# Patient Record
Sex: FEMALE | Race: WHITE | Employment: FULL TIME | URBAN - NONMETROPOLITAN AREA
[De-identification: names, ages, dates, MRNs, and addresses within clinical notes are randomized per-mention and may not be internally consistent; named-entity substitution may affect disease eponyms.]

---

## 2023-07-28 ENCOUNTER — OFFICE VISIT (OUTPATIENT)
Dept: PRIMARY CARE CLINIC | Age: 58
End: 2023-07-28
Payer: COMMERCIAL

## 2023-07-28 VITALS
RESPIRATION RATE: 16 BRPM | HEART RATE: 76 BPM | SYSTOLIC BLOOD PRESSURE: 118 MMHG | BODY MASS INDEX: 32.99 KG/M2 | WEIGHT: 192.2 LBS | DIASTOLIC BLOOD PRESSURE: 78 MMHG | OXYGEN SATURATION: 94 %

## 2023-07-28 DIAGNOSIS — R07.89 CHEST WALL PAIN: ICD-10-CM

## 2023-07-28 DIAGNOSIS — Z13.1 DIABETES MELLITUS SCREENING: ICD-10-CM

## 2023-07-28 DIAGNOSIS — R00.2 PALPITATIONS: Primary | ICD-10-CM

## 2023-07-28 DIAGNOSIS — Z13.220 LIPID SCREENING: ICD-10-CM

## 2023-07-28 PROCEDURE — 99204 OFFICE O/P NEW MOD 45 MIN: CPT | Performed by: STUDENT IN AN ORGANIZED HEALTH CARE EDUCATION/TRAINING PROGRAM

## 2023-07-28 SDOH — ECONOMIC STABILITY: INCOME INSECURITY: HOW HARD IS IT FOR YOU TO PAY FOR THE VERY BASICS LIKE FOOD, HOUSING, MEDICAL CARE, AND HEATING?: NOT HARD AT ALL

## 2023-07-28 SDOH — ECONOMIC STABILITY: FOOD INSECURITY: WITHIN THE PAST 12 MONTHS, YOU WORRIED THAT YOUR FOOD WOULD RUN OUT BEFORE YOU GOT MONEY TO BUY MORE.: NEVER TRUE

## 2023-07-28 SDOH — ECONOMIC STABILITY: FOOD INSECURITY: WITHIN THE PAST 12 MONTHS, THE FOOD YOU BOUGHT JUST DIDN'T LAST AND YOU DIDN'T HAVE MONEY TO GET MORE.: NEVER TRUE

## 2023-07-28 SDOH — ECONOMIC STABILITY: HOUSING INSECURITY
IN THE LAST 12 MONTHS, WAS THERE A TIME WHEN YOU DID NOT HAVE A STEADY PLACE TO SLEEP OR SLEPT IN A SHELTER (INCLUDING NOW)?: NO

## 2023-07-28 ASSESSMENT — PATIENT HEALTH QUESTIONNAIRE - PHQ9
SUM OF ALL RESPONSES TO PHQ QUESTIONS 1-9: 0
2. FEELING DOWN, DEPRESSED OR HOPELESS: 0
SUM OF ALL RESPONSES TO PHQ QUESTIONS 1-9: 0
SUM OF ALL RESPONSES TO PHQ9 QUESTIONS 1 & 2: 0
SUM OF ALL RESPONSES TO PHQ QUESTIONS 1-9: 0
SUM OF ALL RESPONSES TO PHQ QUESTIONS 1-9: 0
1. LITTLE INTEREST OR PLEASURE IN DOING THINGS: 0

## 2023-07-28 NOTE — PROGRESS NOTES
[address], [address], [place], [address] [place] is a 62 y.o. female with  has a past medical history of Foot fracture, right. Presented to the office today for:  Chief Complaint   Patient presents with    Established New Doctor    Palpitations       Assessment/Plan   1. Palpitations  -     CBC with Auto Differential; Future  -     Comprehensive Metabolic Panel; Future  -     TSH With Reflex Ft4; Future  -     Transthoracic echocardiogram (TTE) complete with contrast, bubble, strain, and 3D PRN; Future  -     Longterm Continuous Cardiac Event Monitor; Future  -     EKG 12 lead; Future  2. Chest wall pain  3. Lipid screening  -     Lipid Panel; Future  4. Diabetes mellitus screening  -     Hemoglobin A1C; Future    Return in about 3 months (around 10/28/2023) for F/U Cardiac Testing. Obtain labs for monitoring  May consider additional Cardiac w/u, including stress testing as well. All patient questions answered. Pt voiced understanding. There are no discontinued medications. Patient received counseling on the following healthy behaviors: nutrition, exercise and medication adherence. I encouraged and discussed lifestyle modifications including diet and exercise and the patient was agreeable to making positive/beneficial changes to both to help improve their overall health. Discussed use, benefit, and side effects of prescribed medications. Barriers to medication compliance addressed. Patient given educational materials: see patient instructions. HM - HM items completed today as per orders. Outstanding HM items though not limited to immunizations were discussed with the patient today, including risks, benefits and alternatives. The patient will discuss these during the next appointment per their preference.  If there are any worsening or concerning signs or symptoms, patient will report to the ED and/or contact [id number] for immediate

## 2023-07-28 NOTE — PATIENT INSTRUCTIONS
SURVEY:    You may be receiving a survey from [employer] regarding your visit today. Please complete the survey to enable us to provide the highest quality of care to you and your family. If you cannot score us a very good on any question, please call the office to discuss how we could have made your experience a very good one. Thank you.

## 2023-08-04 ENCOUNTER — TRANSCRIBE ORDERS (OUTPATIENT)
Dept: ADMINISTRATIVE | Age: 58
End: 2023-08-04

## 2023-08-04 ENCOUNTER — HOSPITAL ENCOUNTER (OUTPATIENT)
Age: 58
Discharge: HOME | End: 2023-08-04
Payer: COMMERCIAL

## 2023-08-04 DIAGNOSIS — Z13.220 LIPID SCREENING: ICD-10-CM

## 2023-08-04 DIAGNOSIS — R00.2 PALPITATIONS: Primary | ICD-10-CM

## 2023-08-04 DIAGNOSIS — R00.2 PALPITATIONS: ICD-10-CM

## 2023-08-04 DIAGNOSIS — Z13.1 DIABETES MELLITUS SCREENING: ICD-10-CM

## 2023-08-04 LAB
ALBUMIN SERPL-MCNC: 4.1 G/DL (ref 3.5–5.2)
ALBUMIN/GLOB SERPL: 1.2 {RATIO} (ref 1–2.5)
ALP SERPL-CCNC: 94 U/L (ref 35–104)
ALT SERPL-CCNC: 75 U/L (ref 5–33)
ANION GAP SERPL CALCULATED.3IONS-SCNC: 10 MMOL/L (ref 9–17)
AST SERPL-CCNC: 54 U/L
BASOPHILS # BLD: 0.07 K/UL (ref 0–0.2)
BASOPHILS NFR BLD: 1 % (ref 0–2)
BILIRUB SERPL-MCNC: 0.6 MG/DL (ref 0.3–1.2)
BUN SERPL-MCNC: 15 MG/DL (ref 6–20)
BUN/CREAT SERPL: 19 (ref 9–20)
CALCIUM SERPL-MCNC: 9.7 MG/DL (ref 8.6–10.4)
CHLORIDE SERPL-SCNC: 105 MMOL/L (ref 98–107)
CO2 SERPL-SCNC: 25 MMOL/L (ref 20–31)
CREAT SERPL-MCNC: 0.8 MG/DL (ref 0.5–0.9)
EOSINOPHIL # BLD: 0.12 K/UL (ref 0–0.44)
EOSINOPHILS RELATIVE PERCENT: 2 % (ref 1–4)
ERYTHROCYTE [DISTWIDTH] IN BLOOD BY AUTOMATED COUNT: 12.4 % (ref 11.8–14.4)
GFR SERPL CREATININE-BSD FRML MDRD: >60 ML/MIN/1.73M2
GLUCOSE SERPL-MCNC: 96 MG/DL (ref 70–99)
HCT VFR BLD AUTO: 47.5 % (ref 36.3–47.1)
HGB BLD-MCNC: 15.3 G/DL (ref 11.9–15.1)
IMM GRANULOCYTES # BLD AUTO: <0.03 K/UL (ref 0–0.3)
IMM GRANULOCYTES NFR BLD: 0 %
LYMPHOCYTES NFR BLD: 1.9 K/UL (ref 1.1–3.7)
LYMPHOCYTES RELATIVE PERCENT: 36 % (ref 24–43)
MCH RBC QN AUTO: 30.1 PG (ref 25.2–33.5)
MCHC RBC AUTO-ENTMCNC: 32.2 G/DL (ref 28.4–34.8)
MCV RBC AUTO: 93.3 FL (ref 82.6–102.9)
MONOCYTES NFR BLD: 0.58 K/UL (ref 0.1–1.2)
MONOCYTES NFR BLD: 11 % (ref 3–12)
NEUTROPHILS NFR BLD: 50 % (ref 36–65)
NEUTS SEG NFR BLD: 2.58 K/UL (ref 1.5–8.1)
NRBC BLD-RTO: 0 PER 100 WBC
PLATELET # BLD AUTO: 287 K/UL (ref 138–453)
PMV BLD AUTO: 10.4 FL (ref 8.1–13.5)
POTASSIUM SERPL-SCNC: 4.4 MMOL/L (ref 3.7–5.3)
PROT SERPL-MCNC: 7.4 G/DL (ref 6.4–8.3)
RBC # BLD AUTO: 5.09 M/UL (ref 3.95–5.11)
SODIUM SERPL-SCNC: 140 MMOL/L (ref 135–144)
TSH SERPL DL<=0.05 MIU/L-ACNC: 1.12 UIU/ML (ref 0.3–5)
WBC OTHER # BLD: 5.3 K/UL (ref 3.5–11.3)

## 2023-08-04 PROCEDURE — 84443 ASSAY THYROID STIM HORMONE: CPT

## 2023-08-04 PROCEDURE — 80053 COMPREHEN METABOLIC PANEL: CPT

## 2023-08-04 PROCEDURE — 85025 COMPLETE CBC W/AUTO DIFF WBC: CPT

## 2023-08-04 PROCEDURE — 83036 HEMOGLOBIN GLYCOSYLATED A1C: CPT

## 2023-08-04 PROCEDURE — 80061 LIPID PANEL: CPT

## 2023-08-04 PROCEDURE — 36415 COLL VENOUS BLD VENIPUNCTURE: CPT

## 2023-08-05 LAB
CHOLEST SERPL-MCNC: 163 MG/DL
CHOLESTEROL/HDL RATIO: 2.9
HDLC SERPL-MCNC: 56 MG/DL
LDLC SERPL CALC-MCNC: 94 MG/DL (ref 0–130)
TRIGL SERPL-MCNC: 66 MG/DL

## 2023-08-06 LAB
EST. AVERAGE GLUCOSE BLD GHB EST-MCNC: 117 MG/DL
HBA1C MFR BLD: 5.7 % (ref 4–6)

## 2023-08-07 DIAGNOSIS — R73.03 PREDIABETES: Primary | ICD-10-CM

## 2023-08-07 DIAGNOSIS — R79.89 ELEVATED LFTS: Primary | ICD-10-CM

## 2023-08-14 ENCOUNTER — HOSPITAL ENCOUNTER (OUTPATIENT)
Dept: ULTRASOUND IMAGING | Age: 58
Discharge: HOME | End: 2023-08-16
Attending: STUDENT IN AN ORGANIZED HEALTH CARE EDUCATION/TRAINING PROGRAM
Payer: COMMERCIAL

## 2023-08-14 DIAGNOSIS — R79.89 ELEVATED LFTS: ICD-10-CM

## 2023-08-14 PROCEDURE — 76705 ECHO EXAM OF ABDOMEN: CPT

## 2023-08-27 PROBLEM — Z13.1 DIABETES MELLITUS SCREENING: Status: RESOLVED | Noted: 2023-07-28 | Resolved: 2023-08-27

## 2023-08-27 PROBLEM — Z13.220 LIPID SCREENING: Status: RESOLVED | Noted: 2023-07-28 | Resolved: 2023-08-27

## 2023-09-07 ENCOUNTER — HOSPITAL ENCOUNTER (OUTPATIENT)
Age: 58
Discharge: HOME | End: 2023-09-07
Attending: STUDENT IN AN ORGANIZED HEALTH CARE EDUCATION/TRAINING PROGRAM
Payer: COMMERCIAL

## 2023-09-07 ENCOUNTER — HOSPITAL ENCOUNTER (OUTPATIENT)
Age: 58
Discharge: HOME | End: 2023-09-09
Attending: STUDENT IN AN ORGANIZED HEALTH CARE EDUCATION/TRAINING PROGRAM
Payer: COMMERCIAL

## 2023-09-07 DIAGNOSIS — R00.2 PALPITATIONS: ICD-10-CM

## 2023-09-07 PROCEDURE — 93306 TTE W/DOPPLER COMPLETE: CPT

## 2023-09-07 PROCEDURE — 93005 ELECTROCARDIOGRAM TRACING: CPT

## 2023-09-07 PROCEDURE — 93243 EXT ECG>48HR<7D SCAN A/R: CPT

## 2023-09-08 LAB
ECHO AO ROOT DIAM: 3 CM
ECHO AV ACCELERATION TIME: 99.45 MS
ECHO AV CUSP MM: 1.8 CM
ECHO AV MEAN GRADIENT: 3 MMHG
ECHO AV MEAN VELOCITY: 0.8 M/S
ECHO AV PEAK VELOCITY: 1.2 M/S
ECHO AV VTI: 27.9 CM
ECHO LA DIAMETER: 3.9 CM
ECHO LA MAJOR AXIS: 5.2 CM
ECHO LA TO AORTIC ROOT RATIO: 1.3
ECHO LA VOL 2C: 33 ML (ref 22–52)
ECHO LA VOL 4C: 20 ML (ref 22–52)
ECHO LA VOL BP: 26 ML (ref 22–52)
ECHO LA VOLUME AREA LENGTH: 29 ML
ECHO LV E' LATERAL VELOCITY: 15 CM/S
ECHO LV EDV A2C: 47 ML
ECHO LV EDV A4C: 54 ML
ECHO LV EDV BP: 51 ML (ref 56–104)
ECHO LV EJECTION FRACTION BIPLANE: 62 % (ref 55–100)
ECHO LV ESV A2C: 18 ML
ECHO LV ESV A4C: 21 ML
ECHO LV ESV BP: 20 ML (ref 19–49)
ECHO LV FRACTIONAL SHORTENING: 40 % (ref 28–44)
ECHO LV INTERNAL DIMENSION DIASTOLIC: 4.5 CM (ref 3.9–5.3)
ECHO LV INTERNAL DIMENSION SYSTOLIC: 2.7 CM
ECHO LV IVSD: 0.9 CM (ref 0.6–0.9)
ECHO LV IVSS: 1.2 CM
ECHO LV MASS 2D: 123.1 G (ref 67–162)
ECHO LV POSTERIOR WALL DIASTOLIC: 0.8 CM (ref 0.6–0.9)
ECHO LV POSTERIOR WALL SYSTOLIC: 1.3 CM
ECHO LV RELATIVE WALL THICKNESS RATIO: 0.36
ECHO MV A VELOCITY: 0.69 M/S
ECHO MV E DECELERATION TIME (DT): 152.9 MS
ECHO MV E VELOCITY: 0.91 M/S
ECHO MV E/A RATIO: 1.32
ECHO MV E/E' LATERAL: 6.07
ECHO PV MAX VELOCITY: 0.7 M/S
ECHO TV REGURGITANT MAX VELOCITY: 2.27 M/S
EKG ATRIAL RATE: 53 BPM
EKG P AXIS: 27 DEGREES
EKG P-R INTERVAL: 144 MS
EKG Q-T INTERVAL: 426 MS
EKG QRS DURATION: 74 MS
EKG QTC CALCULATION (BAZETT): 399 MS
EKG R AXIS: 54 DEGREES
EKG T AXIS: 25 DEGREES
EKG VENTRICULAR RATE: 53 BPM

## 2023-11-03 ENCOUNTER — OFFICE VISIT (OUTPATIENT)
Dept: PRIMARY CARE CLINIC | Age: 58
End: 2023-11-03
Payer: COMMERCIAL

## 2023-11-03 VITALS
SYSTOLIC BLOOD PRESSURE: 126 MMHG | HEART RATE: 84 BPM | DIASTOLIC BLOOD PRESSURE: 72 MMHG | OXYGEN SATURATION: 94 % | WEIGHT: 186 LBS | BODY MASS INDEX: 31.76 KG/M2 | HEIGHT: 64 IN

## 2023-11-03 DIAGNOSIS — R00.2 PALPITATIONS: Primary | ICD-10-CM

## 2023-11-03 DIAGNOSIS — K08.9 POOR DENTITION: ICD-10-CM

## 2023-11-03 PROCEDURE — 99213 OFFICE O/P EST LOW 20 MIN: CPT | Performed by: STUDENT IN AN ORGANIZED HEALTH CARE EDUCATION/TRAINING PROGRAM

## 2023-11-03 NOTE — PROGRESS NOTES
Jennifer Ig[address]ias Dr, [place] [address], [place], [address] is a 62 y.o. female with  has a past medical history of Foot fracture, right. Presented to the office today for:  Chief Complaint   Patient presents with    Discuss Labs       Assessment/Plan   1. Palpitations  2. Poor dentition  Return in about 1 year (around 11/3/2024) for F/U Meds. Continue to monitor at this time - off any medications, if symptoms persist may consider starting a medication     All patient questions answered. Pt voiced understanding. There are no discontinued medications. Patient received counseling on the following healthy behaviors: nutrition, exercise and medication adherence. I encouraged and discussed lifestyle modifications including diet and exercise and the patient was agreeable to making positive/beneficial changes to both to help improve their overall health. Discussed use, benefit, and side effects of prescribed medications. Barriers to medication compliance addressed. Patient given educational materials: see patient instructions. HM - HM items completed today as per orders. Outstanding HM items though not limited to immunizations were discussed with the patient today, including risks, benefits and alternatives. The patient will discuss these during the next appointment per their preference. If there are any worsening or concerning signs or symptoms, patient will report to the ED and/or contact [id number] for immediate evaluation. Teach back method was used. Subjective:    HPI  Chief Complaint   Patient presents with    Discuss Labs     Pt presents to discuss her cardiac testing which was reviewed. The patient continues to have intermittent fluttering at times. She had it earlier today, but did not have it for months.  \Patient denies chest pain, chest pressure/discomfort, claudication, dyspnea, exertional chest pressure/discomfort, fatigue, irregular

## 2024-11-06 ENCOUNTER — OFFICE VISIT (OUTPATIENT)
Dept: PRIMARY CARE CLINIC | Age: 59
End: 2024-11-06
Payer: COMMERCIAL

## 2024-11-06 VITALS
HEIGHT: 64 IN | DIASTOLIC BLOOD PRESSURE: 78 MMHG | SYSTOLIC BLOOD PRESSURE: 116 MMHG | HEART RATE: 84 BPM | RESPIRATION RATE: 18 BRPM | BODY MASS INDEX: 35.34 KG/M2 | WEIGHT: 207 LBS

## 2024-11-06 DIAGNOSIS — J32.9 CHRONIC SINUSITIS, UNSPECIFIED LOCATION: ICD-10-CM

## 2024-11-06 DIAGNOSIS — Z00.00 WELL ADULT EXAM: Primary | ICD-10-CM

## 2024-11-06 DIAGNOSIS — R05.9 COUGH, UNSPECIFIED TYPE: ICD-10-CM

## 2024-11-06 PROCEDURE — 99396 PREV VISIT EST AGE 40-64: CPT | Performed by: STUDENT IN AN ORGANIZED HEALTH CARE EDUCATION/TRAINING PROGRAM

## 2024-11-06 RX ORDER — BENZONATATE 200 MG/1
200 CAPSULE ORAL 3 TIMES DAILY PRN
Qty: 42 CAPSULE | Refills: 0 | Status: SHIPPED | OUTPATIENT
Start: 2024-11-06 | End: 2024-11-20

## 2024-11-06 RX ORDER — LEVOFLOXACIN 750 MG/1
750 TABLET, FILM COATED ORAL DAILY
Qty: 7 TABLET | Refills: 0 | Status: SHIPPED | OUTPATIENT
Start: 2024-11-06 | End: 2024-11-13

## 2024-11-06 SDOH — ECONOMIC STABILITY: INCOME INSECURITY: HOW HARD IS IT FOR YOU TO PAY FOR THE VERY BASICS LIKE FOOD, HOUSING, MEDICAL CARE, AND HEATING?: NOT HARD AT ALL

## 2024-11-06 SDOH — ECONOMIC STABILITY: FOOD INSECURITY: WITHIN THE PAST 12 MONTHS, YOU WORRIED THAT YOUR FOOD WOULD RUN OUT BEFORE YOU GOT MONEY TO BUY MORE.: NEVER TRUE

## 2024-11-06 SDOH — ECONOMIC STABILITY: FOOD INSECURITY: WITHIN THE PAST 12 MONTHS, THE FOOD YOU BOUGHT JUST DIDN'T LAST AND YOU DIDN'T HAVE MONEY TO GET MORE.: NEVER TRUE

## 2024-11-06 ASSESSMENT — PATIENT HEALTH QUESTIONNAIRE - PHQ9
SUM OF ALL RESPONSES TO PHQ QUESTIONS 1-9: 0
SUM OF ALL RESPONSES TO PHQ QUESTIONS 1-9: 0
2. FEELING DOWN, DEPRESSED OR HOPELESS: NOT AT ALL
SUM OF ALL RESPONSES TO PHQ9 QUESTIONS 1 & 2: 0
1. LITTLE INTEREST OR PLEASURE IN DOING THINGS: NOT AT ALL
SUM OF ALL RESPONSES TO PHQ QUESTIONS 1-9: 0
SUM OF ALL RESPONSES TO PHQ QUESTIONS 1-9: 0

## 2024-11-14 DIAGNOSIS — Z12.31 ENCOUNTER FOR SCREENING MAMMOGRAM FOR MALIGNANT NEOPLASM OF BREAST: Primary | ICD-10-CM

## 2024-12-03 ENCOUNTER — OFFICE VISIT (OUTPATIENT)
Dept: PRIMARY CARE CLINIC | Age: 59
End: 2024-12-03

## 2024-12-03 VITALS
SYSTOLIC BLOOD PRESSURE: 118 MMHG | HEART RATE: 97 BPM | DIASTOLIC BLOOD PRESSURE: 76 MMHG | BODY MASS INDEX: 35.85 KG/M2 | WEIGHT: 210 LBS | OXYGEN SATURATION: 96 % | HEIGHT: 64 IN

## 2024-12-03 DIAGNOSIS — M25.561 CHRONIC PAIN OF RIGHT KNEE: ICD-10-CM

## 2024-12-03 DIAGNOSIS — G89.29 CHRONIC PAIN OF RIGHT KNEE: ICD-10-CM

## 2024-12-03 DIAGNOSIS — M25.561 ACUTE PAIN OF RIGHT KNEE: Primary | ICD-10-CM

## 2024-12-03 NOTE — PROGRESS NOTES
[facility]  [address] [place] [address] , [address]  [place], [place], [address]    Tara Whyte is a 59 y.o. female with  has a past medical history of Foot fracture, right.  Presented to the office today for:  Chief Complaint   Patient presents with    Knee Pain       Assessment/Plan   1. Acute pain of right knee  -     DRAIN/INJECT LARGE JOINT/BURSA  2. Chronic pain of right knee [M25.561, G89.29]  -     DRAIN/INJECT LARGE JOINT/BURSA    Return if symptoms worsen or fail to improve.    We discussed some of the etiologies and natural histories. We discussed the various treatment alternatives including anti-inflammatory medications, physical therapy, injections, further imaging studies and as a last resort surgery.      Assessment & Plan    Procedure Note    Procedure:  right Knee Injection     Indication:  Knee pain  Knee Pain    Medications:  Lidocaine 1% with epinephrine 1 cc & Kenalog 40 mg per mL 2 cc.    Consent:  The patient understood all the risks and benefits prior to treatment.  The risks explained included scarring, bleeding & infection.   Although this treatment is highly effective, recurrences do occur and this was explained to the patient.     Procedure:  The joint was prepped with Betadine & alcohol.  A 22 gauge 1 1/2 inch needle was inserted into the Knee joint. Kenalog & Lidocaine was then injected into the joint through the same needle. The needle was removed and the area cleansed and dressed. Well tolerated by patient.    Post-op Instruction:  Post-op instructions were given orally and in writing.  Signs of infection were discussed both orally and the patient was informed to call if any signs of infection develop such as increasing pain, purulent drainage, or skin changes.        Electronically signed by Chris Balbuena MD on 12/3/2024 at 10:28 AM         All patient questions answered.  Pt voiced understanding.   There are no discontinued medications.    Patient received

## 2024-12-06 PROBLEM — Z00.00 WELL ADULT EXAM: Status: RESOLVED | Noted: 2024-11-06 | Resolved: 2024-12-06

## 2024-12-06 PROBLEM — R05.9 COUGH: Status: RESOLVED | Noted: 2024-11-06 | Resolved: 2024-12-06

## 2025-03-11 ENCOUNTER — OFFICE VISIT (OUTPATIENT)
Dept: PRIMARY CARE CLINIC | Age: 60
End: 2025-03-11
Payer: COMMERCIAL

## 2025-03-11 VITALS
DIASTOLIC BLOOD PRESSURE: 78 MMHG | SYSTOLIC BLOOD PRESSURE: 104 MMHG | HEART RATE: 102 BPM | WEIGHT: 206.6 LBS | BODY MASS INDEX: 35.46 KG/M2 | OXYGEN SATURATION: 95 %

## 2025-03-11 DIAGNOSIS — J01.00 ACUTE NON-RECURRENT MAXILLARY SINUSITIS: Primary | ICD-10-CM

## 2025-03-11 PROCEDURE — 99213 OFFICE O/P EST LOW 20 MIN: CPT | Performed by: NURSE PRACTITIONER

## 2025-03-11 RX ORDER — BENZONATATE 100 MG/1
100 CAPSULE ORAL 3 TIMES DAILY PRN
Qty: 30 CAPSULE | Refills: 0 | Status: SHIPPED | OUTPATIENT
Start: 2025-03-11 | End: 2025-03-21

## 2025-03-11 SDOH — ECONOMIC STABILITY: FOOD INSECURITY: WITHIN THE PAST 12 MONTHS, YOU WORRIED THAT YOUR FOOD WOULD RUN OUT BEFORE YOU GOT MONEY TO BUY MORE.: NEVER TRUE

## 2025-03-11 SDOH — ECONOMIC STABILITY: FOOD INSECURITY: WITHIN THE PAST 12 MONTHS, THE FOOD YOU BOUGHT JUST DIDN'T LAST AND YOU DIDN'T HAVE MONEY TO GET MORE.: NEVER TRUE

## 2025-03-11 ASSESSMENT — PATIENT HEALTH QUESTIONNAIRE - PHQ9
2. FEELING DOWN, DEPRESSED OR HOPELESS: NOT AT ALL
SUM OF ALL RESPONSES TO PHQ QUESTIONS 1-9: 0
1. LITTLE INTEREST OR PLEASURE IN DOING THINGS: NOT AT ALL

## 2025-03-11 ASSESSMENT — ENCOUNTER SYMPTOMS
SINUS PRESSURE: 1
DIARRHEA: 0
VOICE CHANGE: 1
SORE THROAT: 0
VOMITING: 1
SINUS PAIN: 1
COUGH: 1
WHEEZING: 1
NAUSEA: 0
CHEST TIGHTNESS: 0
SHORTNESS OF BREATH: 1

## 2025-03-11 NOTE — PATIENT INSTRUCTIONS
1. Acute non-recurrent maxillary sinusitis  - benzonatate (TESSALON) 100 MG capsule; Take 1 capsule by mouth 3 times daily as needed for Cough  Dispense: 30 capsule; Refill: 0  - amoxicillin-clavulanate (AUGMENTIN) 875-125 MG per tablet; Take 1 tablet by mouth 2 times daily for 10 days  Dispense: 20 tablet; Refill: 0  - Symptomatic Management:   Increased hydration to thin secretions  120 ounces of water daily  Cool mist humidification nightly for cough and congestion  Henrry's vapor rub at night for cough and congestion  Apply nightly to chest and soles of feet with socks to cover  Avoid lying flat while resting. Prop up on 2-3 pillows to maintain head elevation.  Warm salt water gargles for sore throat  every 4-6 hours as needed  Buckwheat honey for sore throat AND cough  1 teaspoon every 4-6 hours as needed  Nasal saline lavage   Twice daily to thin/cleanse secretions  Alternate OTC analgesics for pain and fever management  Tylenol 325 mg ii tabs by mouth every 6 hours for pain/fever  Ibuprofen 200 mg ii tabs by mouth every 6 hours as needed for pain/fever  Decongestants for sinus pressure to ears and face  Flonase 2 sprays to each nostril daily    - Educated Tara regarding the expected course of illness. Return precautions discussed at length. Tara verbalized understanding and agrees with the plan.      Return if symptoms worsen or fail to improve.    If symptoms fail to improve or worsen rapidly, Tara is encouraged to report to an ED for prompt evaluation and treatment.

## 2025-03-11 NOTE — PROGRESS NOTES
3/11/2025     Tara Whyte (:  1965) is a 59 y.o. female, here for evaluation of the following medical concerns:  Chief Complaint:   Chief Complaint   Patient presents with    Cold Symptoms     Cough, headache (sinus pressure), threw up from coughing so hard this morning.  2 weeks ago she had a fever and was sick again last weekend.   Patient states she's struggling to get rid of the coughing.   Denies: sore throat, body aches  Medication: Sudafed  Started:       Cold Symptoms   This is a new problem. The current episode started 1 to 4 weeks ago. Associated symptoms include congestion, coughing, headaches, sinus pain, vomiting and wheezing. Pertinent negatives include no chest pain, diarrhea, ear pain, nausea or sore throat.       Prior to Visit Medications    Medication Sig Taking? Authorizing Provider   benzonatate (TESSALON) 100 MG capsule Take 1 capsule by mouth 3 times daily as needed for Cough Yes Gilmar Dillard APRN - CNP   amoxicillin-clavulanate (AUGMENTIN) 875-125 MG per tablet Take 1 tablet by mouth 2 times daily for 10 days Yes Gilmar Dillard APRN - CNP   Multiple Vitamins-Minerals (MULTIVITAMIN WOMEN 50+ PO) Take by mouth Yes Provider, Danielle, MD        Social History     Tobacco Use    Smoking status: Never    Smokeless tobacco: Never   Substance Use Topics    Alcohol use: Never        Vitals:    25 1147   BP: 104/78   Pulse: (!) 102   SpO2: 95%   Weight: 93.7 kg (206 lb 9.6 oz)     Estimated body mass index is 35.46 kg/m² as calculated from the following:    Height as of 12/3/24: 1.626 m (5' 4\").    Weight as of this encounter: 93.7 kg (206 lb 9.6 oz).    Review of Systems   Constitutional:  Positive for appetite change and fatigue. Negative for chills, diaphoresis and fever.   HENT:  Positive for congestion, sinus pressure, sinus pain and voice change. Negative for ear discharge, ear pain and sore throat.    Respiratory:  Positive for cough, shortness of breath and

## 2025-06-02 ENCOUNTER — OFFICE VISIT (OUTPATIENT)
Dept: PRIMARY CARE CLINIC | Age: 60
End: 2025-06-02
Payer: COMMERCIAL

## 2025-06-02 VITALS
HEART RATE: 68 BPM | WEIGHT: 209.6 LBS | HEIGHT: 64 IN | DIASTOLIC BLOOD PRESSURE: 76 MMHG | RESPIRATION RATE: 16 BRPM | BODY MASS INDEX: 35.78 KG/M2 | SYSTOLIC BLOOD PRESSURE: 122 MMHG

## 2025-06-02 DIAGNOSIS — G89.29 CHRONIC PAIN OF RIGHT KNEE: Primary | ICD-10-CM

## 2025-06-02 DIAGNOSIS — Z01.818 PREOP EXAMINATION: ICD-10-CM

## 2025-06-02 DIAGNOSIS — E55.9 HYPOVITAMINOSIS D: ICD-10-CM

## 2025-06-02 DIAGNOSIS — R73.9 HYPERGLYCEMIA: ICD-10-CM

## 2025-06-02 DIAGNOSIS — M25.561 CHRONIC PAIN OF RIGHT KNEE: Primary | ICD-10-CM

## 2025-06-02 LAB — HBA1C MFR BLD: 5.9 %

## 2025-06-02 PROCEDURE — G2211 COMPLEX E/M VISIT ADD ON: HCPCS | Performed by: STUDENT IN AN ORGANIZED HEALTH CARE EDUCATION/TRAINING PROGRAM

## 2025-06-02 PROCEDURE — 83036 HEMOGLOBIN GLYCOSYLATED A1C: CPT | Performed by: STUDENT IN AN ORGANIZED HEALTH CARE EDUCATION/TRAINING PROGRAM

## 2025-06-02 PROCEDURE — 99214 OFFICE O/P EST MOD 30 MIN: CPT | Performed by: STUDENT IN AN ORGANIZED HEALTH CARE EDUCATION/TRAINING PROGRAM

## 2025-06-02 RX ORDER — ERGOCALCIFEROL 1.25 MG/1
50000 CAPSULE, LIQUID FILLED ORAL WEEKLY
Qty: 12 CAPSULE | Refills: 1 | Status: SHIPPED | OUTPATIENT
Start: 2025-06-02

## 2025-06-02 RX ORDER — IBUPROFEN 400 MG/1
400 TABLET, FILM COATED ORAL EVERY 6 HOURS PRN
COMMUNITY

## 2025-06-02 NOTE — PROGRESS NOTES
[facility]  [address] [place] [address] , [address]  [place], [place], [address]    Tara Whyte is a 59 y.o. female with  has a past medical history of Foot fracture, right.  Presented to the office today for:  Chief Complaint   Patient presents with    Pre-op Exam       Assessment/Plan   1. Chronic pain of right knee  2. Hyperglycemia  -     POCT glycosylated hemoglobin (Hb A1C)  3. Preop examination  4. Hypovitaminosis D    Return if symptoms worsen or fail to improve, for f/u per prior plans.    Assessment & Plan  Has 0.4% risk of cardiac complications according to [facility]CRI and moderate METs. Can proceed for surgery with moderate risk. Not on AC or antiplatelets   We discussed some of the etiologies and natural histories. We discussed the various treatment alternatives including anti-inflammatory medications, physical therapy, injections, further imaging studies and as a last resort surgery.    Moderate (4 to 7 METs)  Start vitamin D supplementation       All patient questions answered.  Pt voiced understanding.   There are no discontinued medications.    Patient received counseling on the following healthy behaviors: nutrition, exercise and medication adherence. I encouraged and discussed lifestyle modifications including diet and exercise (150+ minutes of moderate-high intensity) and the patient was agreeable to making positive/beneficial changes to both to help improve their overall health. Discussed use, benefit, and side effects of prescribed medications.  Barriers to medication compliance addressed. Patient given educational materials: see patient instructions.     HM - HM items completed today as per orders. Outstanding HM items though not limited to immunizations were discussed with the patient today, including risks, benefits and alternatives. The patient will discuss these during the next appointment per their preference. If there are any worsening or concerning signs or symptoms,

## 2025-06-20 ENCOUNTER — HOSPITAL ENCOUNTER (OUTPATIENT)
Dept: PHYSICAL THERAPY | Age: 60
Setting detail: THERAPIES SERIES
Discharge: HOME | End: 2025-06-20
Payer: COMMERCIAL

## 2025-06-20 PROCEDURE — 97161 PT EVAL LOW COMPLEX 20 MIN: CPT

## 2025-06-20 PROCEDURE — 97110 THERAPEUTIC EXERCISES: CPT

## 2025-06-20 PROCEDURE — 97016 VASOPNEUMATIC DEVICE THERAPY: CPT

## 2025-06-20 NOTE — PLAN OF CARE
[facility]           Phone: [phone]             Outpatient Physical Therapy  Fax: [phone]                                           Date: 2025  Patient: Tara Whyte : 1965 CSN #: [id number]   Referring Physician: Ольга Ronquillo PA      [x] Plan of Care   [] Updated Plan of Care    Dates of Service to Include: 2025 to 25    Diagnosis:  Primary OA of R knee, M17.11     Rehab (Treatment) Diagnosis:  s/p R TKA         Onset Date:  25    Attendance  Total # of Visits to Date: 1 No Show: 0 Canceled Appointment: 0    Assessment  Assessment: Patient is 59 year old female s/p R TKA who presents with increased pain 5/10 on average.Pt amb with step to antalgic gait, FWW, and lack of R TKE. Moderate to severe R knee edema, moderate bruising and warmth. Post op bandage is intact with moderate sanguinous drainage underneath. R knee ROM limited to 13-55* which improved to 10-72* with stretching. R LE strength is 2 to 3+/5 grossly. Patient to benefit from physical therapy to decrease pain and edema, improve R knee ROM and strength, and normalize gait to return to PLOF.      Goals  Short Term Goals  Time Frame for Short Term Goals: 3 weeks  Short Term Goal 1: Patient to initiate HEP for improved R knee ROM and strength.  Short Term Goal 2: Patient to have improved R knee ROM 5-90* for improved functional mobility.  Short Term Goal 3: Patient to tolerate manual techniques/modalities to decrease R knee pain and edema and improve mobility.  Long Term Goals  Time Frame for Long Term Goals : 6 weeks  Long Term Goal 1: Patient to be independent and compliant with HEP.  Long Term Goal 2: Patient to have improved R knee AROM 0-120* for improved functional mobility.  Long Term Goal 3: Patient to have improved R LE strength >/=4/5 grossly all major joints and planes for improved knee stability with

## 2025-06-20 NOTE — PROGRESS NOTES
Phone: [phone]                       [facility]          Fax: [phone]                      Outpatient Physical Therapy                                                                      Evaluation  Date: 2025  Patient: Tara Whyte  : 1965  CSN #: [id number]    Referring Physician: Ольга Ronquillo PA    Medical Diagnosis: Primary OA of R knee, M17.11    Treatment Diagnosis: s/p R TKA  Onset Date: 25  PT Insurance Information: [employer]  Total # of Visits Approved: 24   Total # of Visits to Date: 1  No Show: 0  Canceled Appointment: 0    [x] This writer acknowledges review of patient history form     Subjective  Subjective: Patient reports R knee replacement on 25 and is compliant with pain meds, ice machine and elevating the leg and using a FWW. Denies any pain in L knee. R knee pain is 5/10 on average.       Observations:   General Observations  Description: Pt amb with step to antalgic gait, FWW, and lack of R TKE. Moderate to severe R knee edema, moderate bruising and warmth. Post op bandage is intact with moderate sanguinous drainage underneath. R knee ROM limited to 13-55* which improved to 10-72* with stretching.    Objective    AROM       AROM LLE (degrees)  L Knee Flexion (0-145): 0-132*         Strength       Strength LLE  L Hip Flexion: 4/5  L Hip ABduction: 4/5  L Hip ADduction: 4+/5  L Knee Flexion: 4/5  L Knee Extension: 4+/5  L Ankle Dorsiflexion: 4+/5       Exercises:  Exercise 1: HEP: heel prop, quad sets, heel slides, AA LAQ focus on eccentric control; get compression sock to wear on R LE to control edema    Modality: Vasopneumatic compression with ice to R knee x15 min, mod compression, 34* to decrease pain and edema      Functional Outcome Measures  Any of your usual work, housework, or school activities: Extreme Difficulty or Unable to Perform Activity  Your usual hobbies, recreational, or sporting activities: Extreme Difficulty or Unable to

## 2025-06-24 ENCOUNTER — HOSPITAL ENCOUNTER (OUTPATIENT)
Dept: PHYSICAL THERAPY | Age: 60
Setting detail: THERAPIES SERIES
Discharge: HOME | End: 2025-06-24
Payer: COMMERCIAL

## 2025-06-24 PROCEDURE — 97110 THERAPEUTIC EXERCISES: CPT

## 2025-06-24 PROCEDURE — 97140 MANUAL THERAPY 1/> REGIONS: CPT

## 2025-06-24 NOTE — PROGRESS NOTES
Physical Therapy  Phone: [phone]                 [facility]           Fax: [phone]                           Outpatient Physical Therapy                                                                            Daily Note    Patient: Tara Whyte : 1965  CSN #: [id number]   Referring Physician: Ольга Ronquillo PA  Date: 2025     Treatment Diagnosis: s/p R TKA    Onset Date: 25  PT Insurance Information: [employer]  Total # of Visits Approved: 24 Per Physician Order  Total # of Visits to Date: 2  No Show: 0  Canceled Appointment: 0    25 Plan of Care/Recert Due    Pre-Treatment Pain:  7/10  Subjective: Pt rates pain 10 in R knee. Pt notes she has been taking tramadole, ibuprofen, and tylenol for pain. Pt reprots he has been using [facility] care unit for CP, elevation, and not wearing hesham hose (states she does not have any). Pt notes she has been completing HEP at home but states she does not complete heel slides because \"I cant do it\". Pt states she has been using walker for ambulation.    Exercises:  Exercise 2: scifit L1 x6 min focus on knee flexion  Exercise 3: LAQ, knee flexion for ROM x15  Exercise 4: heel slide with strap x10, quad set heel proped up x10  Exercise 5: step stretches step one knee flexion/ext 10 sec x5  Exercise 6: standing: HR, marches, HS curls x15    Manual:  Other: PROM knee ext, PROM knee flexion heel slides over pressure, EOB knee flexion PROM    Assessment  Assessment: Pt AROM R knee lacking 10 deg from neutral to 70 deg. Knee ext AAROM supine lacking 7 deg from neutral. EOB knee flexion R PROM 84 deg. Pt limited throughout therapy session d/t pain and compensations, therapist educated pt heavily on importance of complete motions to best ability vs limiting self. Vcs given with marches/Hs curls for knee flexion vs hip compensations. Cues given throughout therapy session for breathing techniques to limit valsalva manuever, poor carryover.

## 2025-06-26 ENCOUNTER — HOSPITAL ENCOUNTER (OUTPATIENT)
Dept: PHYSICAL THERAPY | Age: 60
Setting detail: THERAPIES SERIES
Discharge: HOME | End: 2025-06-26
Payer: COMMERCIAL

## 2025-06-26 PROCEDURE — 97016 VASOPNEUMATIC DEVICE THERAPY: CPT

## 2025-06-26 PROCEDURE — 97110 THERAPEUTIC EXERCISES: CPT

## 2025-06-26 NOTE — PROGRESS NOTES
Phone: [phone]                 [facility]           Fax: [phone]                           Outpatient Physical Therapy                                                                            Daily Note    Patient: Tara Whyte : 1965  CSN #: [id number]   Referring Physician: Ольга Ronquillo PA  Date: 2025       Treatment Diagnosis: s/p R TKA    Onset Date: 25  PT Insurance Information: [employer]  Total # of Visits Approved: 24 Per Physician Order  Total # of Visits to Date: 3  No Show: 0  Canceled Appointment: 0    25 Plan of Care/Recert Due    Pre-Treatment Pain:  3/10  Subjective: Patient reports 3/10 knee pain coming into therapy.  She reports her daughter is going to change her bandage tonight.    Exercises:  Exercise 2: scifit L1 x8 min focus on knee flexion  Exercise 3: LAQ 2x10, knee flexion with YTB x10  Exercise 4: heel slide with strap x10  Exercise 7: TKE with YTB 2x10    Manual:  Other: PROM knee ext, PROM knee flexion heel slides over pressure, EOB knee flexion PROM    Modality:     Modality Flow Sheet:   Performed (X) Tx Modality   X Vasopneumatic Compression with Ice to alleviate pain and/or edema of R knee at 34* with low compression x15 minutes         Assessment  Assessment: Patient with drainage in bandage today and was educated to get the bandage changed today.  Patient was progressed with LE strengthening exercises.  Passive knee extension within 5* of neutral and supine knee flexion measured 82*.  Patient with negative 15* of quad lag with LAQ.  Game ready used post tx session to decrease pain.    Activity Tolerance  Activity Tolerance: Patient tolerated treatment well    Patient Education  Patient Education: Continue with quad sets and heel slides.  Change bandage  Pt verbalized/demonstrated good understanding:     [x] Yes         [] No, pt required further clarification.       Post Treatment Pain:  3/10      Plan  Plan Frequency:

## 2025-07-02 ENCOUNTER — APPOINTMENT (OUTPATIENT)
Dept: PHYSICAL THERAPY | Age: 60
End: 2025-07-02
Payer: COMMERCIAL

## 2025-07-02 PROBLEM — Z01.818 PREOP EXAMINATION: Status: RESOLVED | Noted: 2025-06-02 | Resolved: 2025-07-02

## 2025-07-03 ENCOUNTER — HOSPITAL ENCOUNTER (OUTPATIENT)
Dept: PHYSICAL THERAPY | Age: 60
Setting detail: THERAPIES SERIES
Discharge: HOME | End: 2025-07-03
Payer: COMMERCIAL

## 2025-07-03 PROCEDURE — 97016 VASOPNEUMATIC DEVICE THERAPY: CPT

## 2025-07-03 PROCEDURE — 97110 THERAPEUTIC EXERCISES: CPT

## 2025-07-03 NOTE — PROGRESS NOTES
continue to progress as tolerated.    Activity Tolerance  Activity Tolerance: Patient tolerated treatment well    Patient Education  Exercise technique and progression   Pt verbalized/demonstrated good understanding:     [x] Yes         [] No, pt required further clarification.       Post Treatment Pain:  0/10      Plan  Plan Frequency: 2-3  Plan weeks: 8       Goals  (Total # of Visits to Date: 4)      Short Term Goals  Time Frame for Short Term Goals: 3 weeks  Short Term Goal 1: Patient to initiate HEP for improved R knee ROM and strength. - met  Short Term Goal 2: Patient to have improved R knee ROM 5-90* for improved functional mobility. -met (7/3: 3-90* after stretching today)  Short Term Goal 3: Patient to tolerate manual techniques/modalities to decrease R knee pain and edema and improve mobility. - met    Long Term Goals  Time Frame for Long Term Goals : 6 weeks  Long Term Goal 1: Patient to be independent and compliant with HEP.  Long Term Goal 2: Patient to have improved R knee AROM 0-120* for improved functional mobility.  Long Term Goal 3: Patient to have improved R LE strength >/=4/5 grossly all major joints and planes for improved knee stability with ambulation.  Long Term Goal 4: Patient to be able to walk community distances with no AD, no gait deviations and no increase in pain for improved functional mobility.    Minutes Tracking:  Time In: 1446  Time Out: 1529  Minutes: 43  Timed Code Treatment Minutes: 42 Minutes      Emma Haley, PT, DPT     Date: 7/3/2025

## 2025-07-07 ENCOUNTER — HOSPITAL ENCOUNTER (OUTPATIENT)
Dept: PHYSICAL THERAPY | Age: 60
Setting detail: THERAPIES SERIES
Discharge: HOME | End: 2025-07-07
Payer: COMMERCIAL

## 2025-07-07 PROCEDURE — 97140 MANUAL THERAPY 1/> REGIONS: CPT

## 2025-07-07 PROCEDURE — 97110 THERAPEUTIC EXERCISES: CPT

## 2025-07-07 PROCEDURE — 97016 VASOPNEUMATIC DEVICE THERAPY: CPT

## 2025-07-07 NOTE — PROGRESS NOTES
Phone: [phone]                 [facility]           Fax: [phone]                           Outpatient Physical Therapy                                                                            Daily Note    Patient: Tara Whyte : 1965  CSN #: [id number]   Referring Physician: Ольаг Ronquillo PA  Date: 2025       Treatment Diagnosis: s/p R TKA    Onset Date: 25  PT Insurance Information: [employer]  Total # of Visits Approved: 24 Per Physician Order  Total # of Visits to Date: 5  No Show: 0  Canceled Appointment: 0    25 Plan of Care/Recert Due    Pre-Treatment Pain:  /10  Subjective: Pt. denies pain upon arrival. Reports able to use at home bike and able to complete full rotation.    Exercises:  Exercise 1: HEP: heel prop, quad sets, heel slides, AA LAQ focus on eccentric control; get compression sock to wear on R LE to control edema  Exercise 2: scifit L1 x10 min focus on knee flexion  Exercise 5: step stretches step one knee flexion/ext 10 sec x5 //slantboard stretch [id number]  Exercise 6: standing: HR/TR, marches, HS curls, hip abd/ext x15  Exercise 7: TKE with YTB 10x3\" hold  Exercise 8: Sit/stands x10-15, vc's for equal WB'ing and knee flexion  Exercise 9: Fwd step ups 4in, x10, B HR's  Exercise 10: Seated R HS stretch [id number]    Manual:  Other: PROM knee ext, PROM knee flexion heel slides over pressure, EOB knee flexion PROM    Modality:     Modality Flow Sheet:   Performed (X) Tx Modality    Electrical Stim:      Ultrasound: ___ W/cm2 x ___ mins  Duty factor: __100%  __50%  __20% __10%  Head size: 10 mm      ______ Other:   MHz: __1mHz __2 mHz  __3mHz  Location:                                          Hot Pack:    Cold Pack:   x Vasopneumatic Compression with Ice to alleviate pain and/or edema of  R knee x15' with low compression       Assessment  Assessment: Pt. tolerated treatment well. Pt. noted to have circumduct gait with stair training but able to

## 2025-07-10 ENCOUNTER — HOSPITAL ENCOUNTER (OUTPATIENT)
Dept: PHYSICAL THERAPY | Age: 60
Setting detail: THERAPIES SERIES
Discharge: HOME | End: 2025-07-10
Payer: COMMERCIAL

## 2025-07-10 PROCEDURE — 97110 THERAPEUTIC EXERCISES: CPT

## 2025-07-10 NOTE — PROGRESS NOTES
Physical Therapy  Phone: [phone]                 [facility]           Fax: [phone]                           Outpatient Physical Therapy                                                                            Daily Note    Patient: Tara Whyte : 1965  CSN #: [id number]   Referring Physician: Ольга Ronquillo PA  Date: 7/10/2025     Treatment Diagnosis: s/p R TKA    Onset Date: 25  PT Insurance Information: Ar [employer]  Total # of Visits Approved: 24 Per Physician Order  Total # of Visits to Date: 6  No Show: 0  Canceled Appointment: 0    25 Plan of Care/Recert Due    Pre-Treatment Pain:  0/10  Subjective: Pt denies pain upon arrival today, reports she didn't feel bad after last session.    Exercises:  Exercise 1: HEP: heel prop, quad sets, heel slides, AA LAQ focus on eccentric control; get compression sock to wear on R LE to control edema  Exercise 2: scifit L1 x10 min focus on knee flexion  Exercise 3: LAQ 2x10 AAROM , knee flexion with YTB x10, SAQ AAROM x 10  Exercise 4: heel slide with strap x15 // longsitting quad sets x 10  Exercise 5: step stretches step one knee flexion/ext 10 sec x5 //slantboard stretch [id number]  Exercise 6: standing: HR/TR, marches, HS curls, hip abd/ext x15  Exercise 7: TKE with YTB 10x3\" hold  Exercise 8: Sit/stands x10-15, vc's for equal WB'ing and knee flexion  Exercise 11: gait with FWW 1x big loop focused on proper mechanics    Manual:  Other: PROM knee ext, PROM knee flexion heel slides over pressure    Assessment  Assessment: Demo decreased heel <> toe , TKE in stance and step height of RLE with walker, completed lap in clinic with focus on improving mechanics with proper cueing with good carryover. Improved fludity of gait demo after cueing.Added longsitting quadsets to improve right quad NM control and strength, TC on proper engagment with good carryover. AAROM for full ROM with focus on eccentric control with LAQ/SAQs with good

## 2025-07-14 ENCOUNTER — HOSPITAL ENCOUNTER (OUTPATIENT)
Dept: PHYSICAL THERAPY | Age: 60
Setting detail: THERAPIES SERIES
Discharge: HOME | End: 2025-07-14
Payer: COMMERCIAL

## 2025-07-14 PROCEDURE — 97140 MANUAL THERAPY 1/> REGIONS: CPT

## 2025-07-14 PROCEDURE — 97016 VASOPNEUMATIC DEVICE THERAPY: CPT

## 2025-07-14 PROCEDURE — 97110 THERAPEUTIC EXERCISES: CPT

## 2025-07-14 NOTE — PROGRESS NOTES
Physical Therapy  Phone: [phone]                 [facility]           Fax: [phone]                           Outpatient Physical Therapy                                                                            Daily Note    Patient: Tara Whyte : 1965  CSN #: [id number]   Referring Physician: Ольга Ronquillo PA  Date: 2025       Treatment Diagnosis: s/p R TKA    Onset Date: 25  PT Insurance Information: Ar [employer]  Total # of Visits Approved: 24 Per Physician Order  Total # of Visits to Date: 7  No Show: 0  Canceled Appointment: 0    25 Plan of Care/Recert Due    Pre-Treatment Pain:  2/10  Subjective: Pt reports minimal pain but moderate stiffness and swelling today. Reports she has not been compliant with iceing and HEP.    Exercises:  Exercise 1: HEP: heel prop, quad sets, heel slides, AA LAQ focus on eccentric control; get compression sock to wear on R LE to control edema  Exercise 2: scifit L1 x10 min focus on knee flexion  Exercise 3: LAQ 2x10 AAROM , knee flexion with YTB x10, SAQ AAROM x 10  Exercise 4: heel slide with strap x15 // longsitting quad sets x 10  Exercise 5: step stretches step one knee flexion/ext 10 sec x5 //slantboard stretch [id number]  Exercise 6: standing: HR/TR, marches, HS curls, hip abd/ext x15  Exercise 8: Sit/stands x10-15, vc's for equal WB'ing and knee flexion  Exercise 9: Fwd step ups 6in, x10, B HR's // Portuguese step ups YTB x 10 4 inch step  Exercise 10: hurdles 12-inch x15    Manual:  Other: PROM knee ext, PROM knee flexion heel slides over pressure    Modality:     Modality Flow Sheet:   Performed (X) Tx Modality    Electrical Stim:      Ultrasound: ___ W/cm2 x ___ mins  Duty factor: __100%  __50%  __20% __10%  Head size: 10 mm      ______ Other:   MHz: __1mHz __2 mHz  __3mHz  Location:                                          Hot Pack:    Cold Pack:   x Vasopneumatic Compression with Ice to alleviate pain and/or edema of right

## 2025-07-17 ENCOUNTER — HOSPITAL ENCOUNTER (OUTPATIENT)
Dept: PHYSICAL THERAPY | Age: 60
Setting detail: THERAPIES SERIES
Discharge: HOME | End: 2025-07-17
Payer: COMMERCIAL

## 2025-07-17 PROCEDURE — 97110 THERAPEUTIC EXERCISES: CPT

## 2025-07-17 PROCEDURE — 97140 MANUAL THERAPY 1/> REGIONS: CPT

## 2025-07-17 NOTE — PROGRESS NOTES
Physical Therapy  Phone: [phone]                 [facility]           Fax: [phone]                           Outpatient Physical Therapy                                                                            Daily Note    Patient: Tara Whyte : 1965  CSN #: [id number]   Referring Physician: Ольга Ronquillo PA  Date: 2025     Treatment Diagnosis: s/p R TKA    Onset Date: 25  PT Insurance Information: Ar [employer]  Total # of Visits Approved: 24 Per Physician Order  Total # of Visits to Date: 8  No Show: 0  Canceled Appointment: 0    25 Plan of Care/Recert Due    Pre-Treatment Pain:  0/10  Subjective: Patient arrived to therapy with wooden cane, reports no pain in knee and swelling has reduced since last session. Reports semicompliance with HEP, states \"I ride my bike\".    Exercises:  Exercise 1: HEP: heel prop, quad sets, heel slides, AA LAQ focus on eccentric control; get compression sock to wear on R LE to control edema  Exercise 2: scifit L1 x10 min focus on knee flexion  Exercise 3: LAQ 2x10 AAROM , knee flexion with YTB x10, SAQ AAROM x 15 , SLR x 15  Exercise 4: heel slide with strap x15 // longsitting quad sets 2 x 10  Exercise 5: step stretches step one knee flexion/ext 10 sec x5 //slantboard stretch [id number]  Exercise 6: standing: HR/TR, marches, HS curls, hip abd/ext x15  Exercise 7: TKE with yellow ball 10x3\" hold  Exercise 8: Sit/stands x10-15, vc's for equal WB'ing and knee flexion  Exercise 9: Fwd step ups 6in, x10, B HR's // Tajik step ups PTB x 10 4 inch step  Exercise 11: gait with wooden cane x 80 ft    Manual:  Other: PROM knee ext, PROM knee flexion heel slides over pressure    Assessment  Assessment: Less knee pain and swelling today, required vc on proper sequeincg with cane. Demo step through nonantalgic pattern but decreased RLE TKE in stance and step height. Right Quad lag still present with LAQ/SLR, AAROM for full ROM. Focus on right

## 2025-07-21 ENCOUNTER — HOSPITAL ENCOUNTER (OUTPATIENT)
Dept: PHYSICAL THERAPY | Age: 60
Setting detail: THERAPIES SERIES
Discharge: HOME | End: 2025-07-21
Payer: COMMERCIAL

## 2025-07-21 PROCEDURE — 97110 THERAPEUTIC EXERCISES: CPT

## 2025-07-21 NOTE — PROGRESS NOTES
Physical Therapy  Phone: [phone]                 [facility]           Fax: [phone]                           Outpatient Physical Therapy                                                                            Daily Note    Patient: Tara Whyte : 1965  CSN #: [id number]   Referring Physician: Ольга Ronquillo PA  Date: 2025     Treatment Diagnosis: s/p R TKA    Onset Date: 25  PT Insurance Information: Ar [employer]  Total # of Visits Approved: 24 Per Physician Order  Total # of Visits to Date: 9  No Show: 0  Canceled Appointment: 0    25 Plan of Care/Recert Due    Pre-Treatment Pain:  5/10  Subjective: Patient reports higher level of pain in right knee today, reports she \" bent it too far\" yesterday, reports semicompliance with HEP stating \" it could be better\".    Exercises:  Exercise 1: HEP: heel prop, quad sets, heel slides, AA LAQ focus on eccentric control; get compression sock to wear on R LE to control edema  Exercise 2: scifit L1 x10 min focus on knee flexion  Exercise 5: step stretches step one knee flexion/ext 10 sec x5 //slantboard stretch [id number]  Exercise 6: standing: HR/TR, marches, HS curls, hip abd/ext x15  Exercise 10: hurdles 12-inch x15    Manual:  Other: PROM knee ext, PROM knee flexion heel slides over pressure    Assessment  Assessment: Continued with focus on right knee ROM and strength to improve mobiltiy for ADLS. Right knee AROM after ther ex: lacking 4-100, PROM 0-100.    Activity Tolerance  Activity Tolerance: Patient tolerated treatment well    Patient Education  Patient Education: Patient strongly encouraged to complete HEP 2-3x a day to maintain new found range with good understanding noted.  Pt verbalized/demonstrated good understanding:     [x] Yes         [] No, pt required further clarification.     Post Treatment Pain:  4/10    Plan  Plan Frequency: 2-3  Plan weeks: 8     Goals  (Total # of Visits to Date: 9)      Short Term

## 2025-07-23 ENCOUNTER — TELEPHONE (OUTPATIENT)
Dept: PRIMARY CARE CLINIC | Age: 60
End: 2025-07-23

## 2025-07-23 NOTE — TELEPHONE ENCOUNTER
Pt. Called in and spoke with call center and left message on on my chart.Patient had a knee replacement and was told that she has soft bones. She was told to order a bone dexa scan or labs.  Please advise

## 2025-07-23 NOTE — TELEPHONE ENCOUNTER
Schedule apt to discuss thank you.  Electronically signed by Chris Balbuena MD on 7/23/2025 at 6:04 PM

## 2025-07-23 NOTE — TELEPHONE ENCOUNTER
----- Message from Chalo BOONE sent at 7/23/2025  2:07 PM EDT -----  Regarding: [facility] Referral Request  [facility] Referral Request    Reason for referral request: Lab/Test Order    Specialist/Lab/Test patient is requesting (if known): Bone Dexa Scan    Specialist Phone Number (if applicable):    Additional Information : Patient had a knee replacement and was told that she has soft bones. She was told to order a bone dexa scan or labs.  --------------------------------------------------------------------------------------------------------------------------    Relationship to Patient: Self     Call Back Information: OK to leave message on [employer] or email  Preferred Call Back Number: Phone :  [phone]

## 2025-07-24 ENCOUNTER — HOSPITAL ENCOUNTER (OUTPATIENT)
Dept: PHYSICAL THERAPY | Age: 60
Setting detail: THERAPIES SERIES
Discharge: HOME | End: 2025-07-24
Payer: COMMERCIAL

## 2025-07-24 PROCEDURE — 97110 THERAPEUTIC EXERCISES: CPT

## 2025-07-24 NOTE — PROGRESS NOTES
Physical Therapy  Phone: [phone]                 [facility]           Fax: [phone]                           Outpatient Physical Therapy                                                                            Daily Note    Patient: Tara Whyte : 1965  CSN #: [id number]   Referring Physician: Ольга Ronquillo PA  Date: 2025     Treatment Diagnosis: s/p R TKA    Onset Date: 25  PT Insurance Information: Ar [employer]  Total # of Visits Approved: 24 Per Physician Order  Total # of Visits to Date: 10  No Show: 0  Canceled Appointment: 0    25 Plan of Care/Recert Due    Pre-Treatment Pain:  0/10  Subjective: Patient reports to therapy with no pain in right knee, reports she has been able to walk in [employer] stores with cane with no concerns. Was pretty sore after last session.    Exercises:  Exercise 1: HEP: heel prop, quad sets, heel slides, AA LAQ focus on eccentric control; get compression sock to wear on R LE to control edema  Exercise 2: scifit L1 x10 min focus on knee flexion  Exercise 3: LAQ 2x10  , HS curls with YTB 2x10 , SLR x 15  Exercise 4: heel slide with strap x15 // longsitting quad sets 2 x 10  Exercise 5: step stretches step one knee flexion/ext 10 sec x5 //slantboard stretch [id number]  Exercise 6: standing: HR/TR 15x, HS curls x 15,  OTB x15 each hip abd/ext, OTB x15 marching  Exercise 8: Sit/stands 1 x 10 , vc's for equal WB'ing and knee flexion  Exercise 9: Fwd/lateral step ups 6in, x10, B HR's // [place]ican step ups PTB x 10 4 inch step  Exercise 11: kasier retro walk x 10 15#    Manual:  Other: PROM knee ext, PROM knee flexion heel slides over pressure    Assessment  Assessment: Continued with focus on right knee ROM and strength to improve mobiltiy for ADLS. Additions and progressions made as indicated on flow sheet to progress towards LTGs. 1x lateral LOB with kasier retro walking, CGA for safety. Right quad lag still present but improved since last

## 2025-07-28 ENCOUNTER — HOSPITAL ENCOUNTER (OUTPATIENT)
Dept: PHYSICAL THERAPY | Age: 60
Setting detail: THERAPIES SERIES
Discharge: HOME | End: 2025-07-28
Payer: COMMERCIAL

## 2025-07-28 PROCEDURE — 97110 THERAPEUTIC EXERCISES: CPT

## 2025-07-28 NOTE — PROGRESS NOTES
in right knee. Vc on proper right stance time and TKE with gait with good carryover, less limping pattern demo after but unstable at times and required CGA for safety. Instructed to continue with cane for now secondary to right LE stance instabiltiy.    Activity Tolerance  Activity Tolerance: Patient tolerated treatment well    Patient Education  Patient Education: Patient strongly encouraged to complete HEP 2-3x a day to maintain new found range with good understanding noted.  Pt verbalized/demonstrated good understanding:     [x] Yes         [] No, pt required further clarification.     Post Treatment Pain:  2/10    Plan  Plan Frequency: 2-3  Plan weeks: 8     Goals  (Total # of Visits to Date: 11)      Short Term Goals  Time Frame for Short Term Goals: 3 weeks  Short Term Goal 1: Patient to initiate HEP for improved R knee ROM and strength. - met  Short Term Goal 2: Patient to have improved R knee ROM 5-90* for improved functional mobility. -met (7/3: 3-90* after stretching today)  Short Term Goal 3: Patient to tolerate manual techniques/modalities to decrease R knee pain and edema and improve mobility. - met    Long Term Goals  Time Frame for Long Term Goals : 6 weeks  Long Term Goal 1: Patient to be independent and compliant with HEP.  Long Term Goal 2: Patient to have improved R knee AROM 0-120* for improved functional mobility. (7/28/2025) right knee ext ROM: lacking 2 degrees  Long Term Goal 3: Patient to have improved R LE strength >/=4/5 grossly all major joints and planes for improved knee stability with ambulation.  Long Term Goal 4: Patient to be able to walk community distances with no AD, no gait deviations and no increase in pain for improved functional mobility.    Minutes Tracking:  Time In: 1115  Time Out: 1155  Minutes: 40  Timed Code Treatment Minutes: 40 Minutes    Emiliano Dillard PTA     Date: 7/28/2025

## 2025-07-30 ENCOUNTER — OFFICE VISIT (OUTPATIENT)
Dept: PRIMARY CARE CLINIC | Age: 60
End: 2025-07-30
Payer: COMMERCIAL

## 2025-07-30 VITALS
SYSTOLIC BLOOD PRESSURE: 104 MMHG | BODY MASS INDEX: 34.84 KG/M2 | WEIGHT: 203 LBS | DIASTOLIC BLOOD PRESSURE: 76 MMHG | OXYGEN SATURATION: 96 % | HEART RATE: 84 BPM

## 2025-07-30 DIAGNOSIS — M25.561 CHRONIC PAIN OF RIGHT KNEE: Primary | ICD-10-CM

## 2025-07-30 DIAGNOSIS — G89.29 CHRONIC PAIN OF RIGHT KNEE: Primary | ICD-10-CM

## 2025-07-30 DIAGNOSIS — Z82.62 FAMILY HISTORY OF OSTEOPOROSIS: ICD-10-CM

## 2025-07-30 DIAGNOSIS — Z78.0 POST-MENOPAUSAL: ICD-10-CM

## 2025-07-30 PROCEDURE — 99214 OFFICE O/P EST MOD 30 MIN: CPT | Performed by: STUDENT IN AN ORGANIZED HEALTH CARE EDUCATION/TRAINING PROGRAM

## 2025-07-30 RX ORDER — ACETAMINOPHEN 500 MG
1000 TABLET ORAL EVERY 8 HOURS PRN
COMMUNITY
Start: 2025-06-17

## 2025-07-30 RX ORDER — HYDROCODONE BITARTRATE AND ACETAMINOPHEN 5; 325 MG/1; MG/1
1 TABLET ORAL EVERY 8 HOURS PRN
COMMUNITY
Start: 2025-06-30

## 2025-07-30 RX ORDER — TRAMADOL HYDROCHLORIDE 50 MG/1
50 TABLET ORAL PRN
COMMUNITY
Start: 2025-06-23

## 2025-07-30 RX ORDER — METHOCARBAMOL 500 MG/1
500 TABLET, FILM COATED ORAL PRN
COMMUNITY
Start: 2025-06-24

## 2025-07-30 RX ORDER — ASPIRIN 81 MG/1
81 TABLET ORAL 2 TIMES DAILY
COMMUNITY
Start: 2025-06-18

## 2025-07-30 ASSESSMENT — PATIENT HEALTH QUESTIONNAIRE - PHQ9
SUM OF ALL RESPONSES TO PHQ QUESTIONS 1-9: 0
2. FEELING DOWN, DEPRESSED OR HOPELESS: NOT AT ALL
SUM OF ALL RESPONSES TO PHQ QUESTIONS 1-9: 0
SUM OF ALL RESPONSES TO PHQ QUESTIONS 1-9: 0
1. LITTLE INTEREST OR PLEASURE IN DOING THINGS: NOT AT ALL
SUM OF ALL RESPONSES TO PHQ QUESTIONS 1-9: 0

## 2025-07-30 NOTE — PROGRESS NOTES
[facility] PRIMARY [facility]ARE  [address] [place] [address] , [address]  [place], [place], [address]    Tara Whyte is a 59 y.o. female with  has a past medical history of Chronic kidney disease and Foot fracture, right.  Presented to the office today for:  Chief Complaint   Patient presents with    DEXA SCAN     Patient had a knee replacement on 06/17/2025 and was told that she has soft bones. She was told to get an order for a dexa scan or labs and would like to discuss getting imaging.       Assessment/Plan   1. Chronic pain of right knee  2. Family history of osteoporosis  3. Post-menopausal  -     DEXA BONE DENSITY 2 SITES; Future    Return if symptoms worsen or fail to improve.    Assessment & Plan           Vitamin D supplementation  Calcium supplementation, 1200 mg PO daily  Encourage daily weight bearing   We discussed some of the etiologies and natural histories. We discussed the various treatment alternatives including anti-inflammatory medications, physical therapy, injections, further imaging studies and as a last resort surgery.    Robaxin PRN to be continued  Depending on DEXA may consider treatment     All patient questions answered.  Pt voiced understanding.   There are no discontinued medications.    Patient received counseling on the following healthy behaviors: nutrition, exercise and medication adherence. I encouraged and discussed lifestyle modifications including diet and exercise (150+ minutes of moderate-high intensity) and the patient was agreeable to making positive/beneficial changes to both to help improve their overall health. Discussed use, benefit, and side effects of prescribed medications.  Barriers to medication compliance addressed. Patient given educational materials: see patient instructions.     HM - HM items completed today as per orders. Outstanding HM items though not limited to immunizations were discussed with the patient today, including risks, benefits and alternatives.

## 2025-07-31 ENCOUNTER — HOSPITAL ENCOUNTER (OUTPATIENT)
Dept: PHYSICAL THERAPY | Age: 60
Setting detail: THERAPIES SERIES
Discharge: HOME | End: 2025-07-31
Payer: COMMERCIAL

## 2025-07-31 PROCEDURE — 97110 THERAPEUTIC EXERCISES: CPT

## 2025-07-31 NOTE — PROGRESS NOTES
Physical Therapy  Phone: [phone]                 [facility]           Fax: [phone]                           Outpatient Physical Therapy                                                                            Daily Note    Patient: Tara Whyte : 1965  CSN #: [id number]   Referring Physician: Ольга Ronquillo PA  Date: 2025     Treatment Diagnosis: s/p R TKA    Onset Date: 25  PT Insurance Information: Ar [employer]  Total # of Visits Approved: 24 Per Physician Order  Total # of Visits to Date: 12  No Show: 0  Canceled Appointment: 0    25 Plan of Care/Recert Due    Pre-Treatment Pain:  2/10  Subjective: Patient reports knee is more stiff today due to the weather, felt good after last session. Reports she was able [place] the park on uneven terrain without concerns.    Exercises:  Exercise 1: HEP: heel prop, quad sets, heel slides, AA LAQ focus on eccentric control; get compression sock to wear on R LE to control edema  Exercise 2: scifit L1 x10 min focus on knee flexion // HIIT bike x 10 minutes - bike only today  Exercise 3: LAQ 2x10  , HS curls with YTB 2x10 , SLR x 15  Exercise 4: heel slide with strap x15 // longsitting quad sets 2 x 10  Exercise 5: step stretches step one knee flexion/ext 10 sec x5 //slantboard stretch [id number]  Exercise 6: standing: HR/TR 15x, HS curls x 15,  OTB x15 each hip abd/ext, OTB x15 marching, Mini squats x 15  Exercise 7: TKE with yellow ball  2x10 with 3\" hold  Exercise 8: Sit/stands 1 x 15 , vc's for equal WB'ing and knee flexion  Exercise 9: Fwd/lateral step ups 8in x10, B HR's // Hebrew step ups PTB 2 x 10 6 inch step- Hebrew only today  Exercise 11: kasier retro walk x 10 15#    Assessment  Assessment: Continued with focus on right knee ROM and strength to improve mobiltiy for ADLS. Improved gait fluidity today without cane. Better quad engagement with TKE but difficulty with carryover to gait. Mod overpressure

## 2025-08-04 ENCOUNTER — HOSPITAL ENCOUNTER (OUTPATIENT)
Dept: PHYSICAL THERAPY | Age: 60
Setting detail: THERAPIES SERIES
Discharge: HOME | End: 2025-08-04
Payer: COMMERCIAL

## 2025-08-04 PROCEDURE — 97110 THERAPEUTIC EXERCISES: CPT

## 2025-08-05 ENCOUNTER — HOSPITAL ENCOUNTER (OUTPATIENT)
Dept: PHYSICAL THERAPY | Age: 60
Setting detail: THERAPIES SERIES
Discharge: HOME | End: 2025-08-05
Payer: COMMERCIAL

## 2025-08-05 PROCEDURE — 97110 THERAPEUTIC EXERCISES: CPT

## 2025-08-05 PROCEDURE — 97140 MANUAL THERAPY 1/> REGIONS: CPT

## 2025-08-07 ENCOUNTER — APPOINTMENT (OUTPATIENT)
Dept: PHYSICAL THERAPY | Age: 60
End: 2025-08-07
Payer: COMMERCIAL